# Patient Record
Sex: MALE | URBAN - METROPOLITAN AREA
[De-identification: names, ages, dates, MRNs, and addresses within clinical notes are randomized per-mention and may not be internally consistent; named-entity substitution may affect disease eponyms.]

---
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
## 2024-02-05 NOTE — ED PROVIDER NOTE - ATTESTATION, MLM
05-Feb-2024 10:34 I have reviewed and confirmed nurses' notes for patient's medications, allergies, medical history, and surgical history.